# Patient Record
Sex: FEMALE | Race: WHITE | NOT HISPANIC OR LATINO | Employment: FULL TIME | URBAN - METROPOLITAN AREA
[De-identification: names, ages, dates, MRNs, and addresses within clinical notes are randomized per-mention and may not be internally consistent; named-entity substitution may affect disease eponyms.]

---

## 2018-01-22 ENCOUNTER — PATIENT MESSAGE (OUTPATIENT)
Dept: ADMINISTRATIVE | Facility: OTHER | Age: 23
End: 2018-01-22

## 2018-01-22 ENCOUNTER — OFFICE VISIT (OUTPATIENT)
Dept: NEUROLOGY | Facility: CLINIC | Age: 23
End: 2018-01-22
Payer: COMMERCIAL

## 2018-01-22 VITALS
DIASTOLIC BLOOD PRESSURE: 78 MMHG | SYSTOLIC BLOOD PRESSURE: 126 MMHG | WEIGHT: 172.19 LBS | HEART RATE: 78 BPM | BODY MASS INDEX: 29.4 KG/M2 | HEIGHT: 64 IN

## 2018-01-22 DIAGNOSIS — G43.009 MIGRAINE WITHOUT AURA AND WITHOUT STATUS MIGRAINOSUS, NOT INTRACTABLE: ICD-10-CM

## 2018-01-22 DIAGNOSIS — G47.9 TROUBLE IN SLEEPING: ICD-10-CM

## 2018-01-22 DIAGNOSIS — R51.9 CHRONIC DAILY HEADACHE: Primary | ICD-10-CM

## 2018-01-22 DIAGNOSIS — G44.229 CHRONIC TENSION-TYPE HEADACHE, NOT INTRACTABLE: ICD-10-CM

## 2018-01-22 DIAGNOSIS — M79.18 MYOFASCIAL PAIN: ICD-10-CM

## 2018-01-22 PROCEDURE — 99999 PR PBB SHADOW E&M-NEW PATIENT-LVL III: CPT | Mod: PBBFAC,,, | Performed by: NURSE PRACTITIONER

## 2018-01-22 PROCEDURE — 99204 OFFICE O/P NEW MOD 45 MIN: CPT | Mod: S$GLB,,, | Performed by: NURSE PRACTITIONER

## 2018-01-22 RX ORDER — PREDNISONE 20 MG/1
TABLET ORAL
Qty: 30 TABLET | Refills: 0 | Status: SHIPPED | OUTPATIENT
Start: 2018-01-22 | End: 2018-07-10

## 2018-01-22 RX ORDER — TIZANIDINE 2 MG/1
TABLET ORAL
Qty: 60 TABLET | Refills: 5 | Status: SHIPPED | OUTPATIENT
Start: 2018-01-22 | End: 2018-07-10

## 2018-01-22 RX ORDER — DICLOFENAC SODIUM 75 MG/1
TABLET, DELAYED RELEASE ORAL
Qty: 25 TABLET | Refills: 3 | Status: SHIPPED | OUTPATIENT
Start: 2018-01-22

## 2018-01-22 NOTE — PATIENT INSTRUCTIONS
As discussed, here is the telephone number for a PCP here at [facility]. [phone]. If you prefer to go outside of [facility], our clinic will be happy to send copies of our records upon request.     Please download migraine judy deion on phone and begin tracking your headaches

## 2018-01-22 NOTE — PROGRESS NOTES
SUBJECTIVE:  Patient ID: Belinda Paz   MRN: [id number]  Referred By: Aaareferral Self  Chief Complaint: Headache and Consult    History of Present Illness:   22 y.o. female with headaches, presents to clinic alone for evaluation of headaches.     Headaches began May 2017, headaches are located at the nape of her neck, traveling towards the occiput and eventually encompass the entire back of her head from ear to ear, lately she has been waking up with a headache everyday. Headaches are described as a moderate to severe pressure or throbbing type pain. Worsened with movement or activity.  Now headaches are all day, everyday waxing and waning throughout the day.  Pain can be rated anywhere from a 4-8 out of 10, intensifying to peak over 1-2 hours.  Has been taking Excedrin daily.  Headaches have been occurring daily, waxing and waning throughout the day, offers when she wakes up pain is typically rated a 4 out of 10, and intensify to 7-8 out of 10 just before meal times.    Prior to May 2017, she would get headaches but only a couple times per month and headaches were not in the back of her head, they were more temporally located.  Denies changes in her vision, confusion, fever, weakness, bowel or bladder dysfunction.  Since May, she has tried to rule out different causes of her headaches.  She changed her birth control in May to NuvaRing which worsened her headaches and restarted taking OCP shortly after.  She ruled out dehydration, caffeine excess or withdrawal.  Headaches are better after she takes Excedrin and for about 30 minutes after she eats.  Started a [employer] job last January, stress is turbulent, lives in [place], works on the [employer], moved to the [place] area for a few months but moved back [place] .   She has missed 3 days of work in the last month due to her headaches.   Associated Symptoms - nauseated, phonophobia, tail bone pain, between her shoulder blades hurt, neck pain, photophobia,  "worsened with movement/activity   Triggers - going too long between meals   Aggravating Factors - looking at a computer screen, staying at work, loud noise, movement    Alleviating Factors - excedrin, eating a meal   Head Trauma? Infection? Fever? Cancer? Pregnancy? <-- had menstrual cycle 2 weeks ago, on OCP   Recent Changes - new job beginning last January, moved to [place] then back to [place]   Sleep - "okay", wakes up a couple of times throughout the night, she does not feel rested when she wakes up, gets 6 hours of sleep, she feels she wakes up more throughout the night since may than in the past   Eyes - Eyes last checked last March   Family Hx of Migraines (mom)  Positives in bold: Hx of Kidney Stones, asthma, GI bleed, osteoporosis, CAD/MI, CVA/TIA, DM <-- denies, states she does have a family history of thyroid problems     Treatments Tried and Response  Excedrin - helps   Voltaren - given today   Zanaflex - given today  Prednisone - given today     Current Medications:    TRINESSA, 28, 0.18/0.215/0.25 mg-35 mcg (28) tablet, TK 1 T PO QD, Disp: , Rfl: 3    diclofenac (VOLTAREN) 75 MG EC tablet, 1 tab every 8-10 hours as needed for headaches. No more than 2 tabs/day or 3 days/week., Disp: 25 tablet, Rfl: 3    predniSONE (DELTASONE) 20 MG tablet, 3 tabs in AM x 5 days, then 2 tabs in AM x 5 days, then 1 tab in Am x 5 days. Take with food., Disp: 30 tablet, Rfl: 0    tiZANidine (ZANAFLEX) 2 MG tablet, 1-2 tabs around bedtime. No driving with medication, no alcohol., Disp: 60 tablet, Rfl: 5    Review of Systems - as per HPI, otherwise a balanced 10 systems review is negative.    OBJECTIVE:  Vitals:  /78   Pulse 78   Ht 5' 4" (1.626 m)   Wt 78.1 kg (172 lb 2.9 oz)   BMI 29.55 kg/m²     Physical Exam   Constitutional: She appears well-developed and well-nourished. She is well groomed. NAD  HENT:    Head: Normocephalic and atraumatic, oral and nasal mucosa intact. Mild oral crowding present. " Frontalis was TTP bilaterally, temporalis was TTP bilaterally    Eyes: Conjunctivae and EOM are normal. Pupils are equal, round, and reactive to light   Neck: Neck supple. Occiput TTP bilaterally and trapezius TTP bilaterally, traps tight bilaterally R>L.  DROM in neck, pain elicited with movement in all directions, worst with right lateral bend.   Musculoskeletal: Normal range of motion. No joint stiffness. No vertebral point tenderness.  Skin: Skin is warm and dry.  Psychiatric: Normal mood and affect.     Neuro exam:    Mental status:  The patient is awake, alert, and oriented to person, place and time.  Language is intact and fluent  Remote and recent memory are intact  Normal attention and concentration  Mood is stable    Cranial Nerves:  Pupils are equal and reactive to light.    Extraocular movements are intact and without nystagmus.    Visual fields are full to confrontation testing.   Facial movement is symmetric.  Facial sensation is intact.    Hearing is intact to finger rub   Uvula in midline. Tongue in midline without fasciculation.   FROM of neck in all (6) directions.  Shoulder shrug symmetrical.    Coordination:     Finger to nose - normal and symmetric bilaterally   Heel to shin test - normal and symmetric bilaterally     Motor:  Normal muscle bulk and symmetry. No fasciculations were noted.   Tremor not apparent   Pronator drift not apparent.    strength was strong and symmetric   Finger extension strength was strong and symmetric   RUE:appropriate against gravity and medium force as tested 5/5  LUE: appropriate against gravity and medium force as tested 5/5  RLE:appropriate against gravity and medium force as tested 5/5              LLE: appropriate against gravity and medium force as tested 5/5    Reflexes:  Right Brachioradialis 2+  Left Brachioradialis 2+  Right Biceps 2+  Left Biceps 2+  Right Patellar2+  Left Patellar 2+  Right Achilles 2+  Left Achilles 2+                           Plantar flexion bilat   Cunningham was negative bilaterally      Sensory:  RUE  intact light touch  LUE intact light touch    RLE intact light touch  LLE intact light touch    Gait:   Romberg - negative   Normal gait  Tandem, Heel, and Toe Walk - able to perform without difficulty     ASSESSMENT:  1. Chronic daily headache    2. Chronic tension-type headache, not intractable    3. Myofascial pain    4. Trouble in sleeping    5. Migraine without aura and without status migrainosus, not intractable      PLAN:  - To break up headache cycle - Prednisone 60 mg x 5 days, 40 mg x 5 days, 20 mg x 5 days    Take medication in the morning with food   - For headache prevention - start Zanaflex 2 mg nightly, 1-2 tabs around bedtime, no alcohol or driving with medication   - For acute headaches - given Voltaren 75 mg tabs, 1 tab every 8-10 hours as needed for headaches   - Start tracking headaches via Migraine Buddy deion on phone, discussed importance of tracking headaches with regards to symptoms she experiences when she has a headache  - May start triptan therapy at next appointment   - Offered referral to physical therapy - she deferred at this time   - Enrolled patient in virtual migraine questionnaire    - Offered to order lab work, she deferred this  - Encouraged her to establish care with PCP, given number to [facility]   - RTC in 6 weeks or sooner if needed      Orders Placed This Encounter    tiZANidine (ZANAFLEX) 2 MG tablet    predniSONE (DELTASONE) 20 MG tablet    diclofenac (VOLTAREN) 75 MG EC tablet    Virtual Migraine Questionnaire Series     I have discussed realistic goals of care with patient at length as well as medication options, and need for lifestyle adjustment. I have explained that treatment will take time. We have agreed that the goal will be to reduce frequency/intensity/quantity of HA, not to be completely HA free. I have explained my non narcotic policy regarding headache  treatment.    Patient to track frequency of headaches.  Patient agreeable to work on lifestyle adjustments.    I spent 50 minutes face-to-face with the patient with >50% of the time spent with counseling and education regarding:  - results of data, diagnosis, and recommendations stated above  - risks, benefits, and potential side effects of prednisone, zanaflex, and diclofenac discussed   - alternative treatment options including amitriptyline, vistaril offered   - importance of healthy diet, regular exercise and sleep hygiene in the treatment of headaches      Questions and concerns were sought and answered to the patient's stated verbal satisfaction.  The patient verbalizes understanding and agreement with the above stated treatment plan.       Zully Patterson, TRIP-C  [facility]  [phone]    Dr. Woodson was available during today's encounter.

## 2018-01-24 ENCOUNTER — PATIENT MESSAGE (OUTPATIENT)
Dept: NEUROLOGY | Facility: CLINIC | Age: 23
End: 2018-01-24

## 2018-02-01 ENCOUNTER — PATIENT MESSAGE (OUTPATIENT)
Dept: NEUROLOGY | Facility: CLINIC | Age: 23
End: 2018-02-01

## 2018-07-10 ENCOUNTER — TELEPHONE (OUTPATIENT)
Dept: NEUROLOGY | Facility: CLINIC | Age: 23
End: 2018-07-10

## 2018-07-10 ENCOUNTER — TELEPHONE (OUTPATIENT)
Dept: OBSTETRICS AND GYNECOLOGY | Facility: CLINIC | Age: 23
End: 2018-07-10

## 2018-07-10 ENCOUNTER — OFFICE VISIT (OUTPATIENT)
Dept: OBSTETRICS AND GYNECOLOGY | Facility: CLINIC | Age: 23
End: 2018-07-10
Payer: COMMERCIAL

## 2018-07-10 VITALS
DIASTOLIC BLOOD PRESSURE: 78 MMHG | HEIGHT: 64 IN | BODY MASS INDEX: 28.91 KG/M2 | WEIGHT: 169.31 LBS | SYSTOLIC BLOOD PRESSURE: 128 MMHG

## 2018-07-10 DIAGNOSIS — N92.1 METRORRHAGIA: ICD-10-CM

## 2018-07-10 DIAGNOSIS — N94.6 DYSMENORRHEA: ICD-10-CM

## 2018-07-10 DIAGNOSIS — Z01.419 WELL WOMAN EXAM WITH ROUTINE GYNECOLOGICAL EXAM: Primary | ICD-10-CM

## 2018-07-10 DIAGNOSIS — Z30.41 ENCOUNTER FOR SURVEILLANCE OF CONTRACEPTIVE PILLS: ICD-10-CM

## 2018-07-10 PROCEDURE — 99385 PREV VISIT NEW AGE 18-39: CPT | Mod: S$GLB,,, | Performed by: NURSE PRACTITIONER

## 2018-07-10 PROCEDURE — 88175 CYTOPATH C/V AUTO FLUID REDO: CPT

## 2018-07-10 PROCEDURE — 99999 PR PBB SHADOW E&M-EST. PATIENT-LVL III: CPT | Mod: PBBFAC,,, | Performed by: NURSE PRACTITIONER

## 2018-07-10 RX ORDER — LEVONORGESTREL AND ETHINYL ESTRADIOL 0.15-0.03
1 KIT ORAL DAILY
Qty: 84 TABLET | Refills: 4 | Status: SHIPPED | OUTPATIENT
Start: 2018-07-10 | End: 2018-07-10

## 2018-07-10 NOTE — PROGRESS NOTES
"HISTORY OF PRESENT ILLNESS:    Belinda Paz is a 22 y.o. female, ., Patient's last menstrual period was 2018 (exact date).,  presents for a routine exam and OCP refills.  -Here to establish care.  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   -Was on Nuvaring for 6 months and had weight gain, despite diet and exercise, and tearful mood swings so started OCPs.  -Has been on Tri-yesica for a year and initially had no problems.  -Lately has menstrual migraines, weight gain despite diet, exercise, mood swings and heavy periods for full 7 days with cramping.   -Denies clots, gushing, missing work and denies life stressors.  -Ran out of pills for a month and mood swings - anxiety is less - hasn't had a period yet.     Past Medical History:   Diagnosis Date    Headache        PAST SURGICAL HISTORY:  History reviewed. No pertinent surgical history.    MEDICATIONS AND ALLERGIES:  Voltaren  Review of patient's allergies indicates:  No Known Allergies    Family History   Problem Relation Age of Onset    Migraines Mother     Cancer Father     Diabetes Maternal Grandmother     Colon cancer Maternal Aunt     Breast cancer Neg Hx     Ovarian cancer Neg Hx        Social History     Social History    Marital status: Single     Spouse name: N/A    Number of children: N/A    Years of education: N/A     Occupational History    Not on file.     Social History Main Topics    Smoking status: Never Smoker    Smokeless tobacco: Never Used    Alcohol use 3.0 oz/week     5 Glasses of wine per week    Drug use: No    Sexual activity: Yes     Partners: Male     Birth control/ protection: OCP     Other Topics Concern    Not on file     Social History Narrative    No narrative on file       OB HISTORY: None.     COMPREHENSIVE GYN HISTORY:  PAP History: Denies abnormal Paps. UNKNOWN DATE OF LAST PAP "NEG".  Infection History: Denies STDs. Denies PID.  Benign History: Denies uterine fibroids. " "Denies ovarian cysts. Denies endometriosis. Denies other conditions.  Cancer History: Denies cervical cancer. Denies uterine cancer or hyperplasia. Denies ovarian cancer. Denies vulvar cancer or pre-cancer. Denies vaginal cancer or pre-cancer. Denies breast cancer. Denies colon cancer.  Sexual Activity History: Reports currently being sexually active  Menstrual History: Monthly. Heavy flow. 7 Days.  Dysmenorrhea History: Reports mod. ysmenorrhea.   Contraception: Condoms.    ROS:  GENERAL: + WT GAIN. No swelling. No fatigue. No fever.  CARDIOVASCULAR: No chest pain. No shortness of breath. No leg cramps.   NEUROLOGICAL: + MENSTRUAL MIGRAINES.  BREASTS: No pain. No lumps. No discharge.  ABDOMEN: + CRAMPING.  No nausea. No vomiting. No diarrhea. No constipation.  REPRODUCTIVE: + HEAVY PERIODS.   VULVA: No pain. No lesions. No itching.  VAGINA: No relaxation. No itching. No odor. No discharge. No lesions.  URINARY: No incontinence. No nocturia. No frequency. No dysuria.    /78   Ht 5' 4" (1.626 m)   Wt 76.8 kg (169 lb 5 oz)   LMP 06/25/2018 (Exact Date)   BMI 29.06 kg/m²     PE:  APPEARANCE: Well nourished, well developed, in no acute distress.  AFFECT: WNL, alert and oriented x 3.  SKIN: No acne or hirsutism.  NECK: Neck symmetric, without masses or thyromegaly.  NODES: No inguinal, cervical, axillary or femoral lymph node enlargement.  CHEST: Good respiratory effort.   ABDOMEN: Soft. No tenderness or masses.   BREASTS: Symmetrical, no skin changes, visible lesions, palpable masses or nipple discharge bilaterally.  PELVIC: External female genitalia without lesions.  Female hair distribution. Adequate perineal body, Normal urethral meatus. Vagina moist and well rugated without lesions or discharge.  No significant cystocele or rectocele present. Cervix pink without lesions, discharge or tenderness. Uterus is 4-6 week size, regular, mobile and nontender. Adnexa without masses or tenderness.  EXTREMITIES: No " edema    DIAGNOSIS:  1. Well woman exam with routine gynecological exam    2. Encounter for surveillance of contraceptive pills    3. Dysmenorrhea    4. Metrorrhagia        PLAN:    Orders Placed This Encounter    Liquid-based pap smear, screening   Declined STD testing    COUNSELING:  The patient was counseled today on:  -all contraceptive options;  -to stop all hormonal Rx for 6 months to see if her anxiety - mood swings - wt gain - heavy bleeding / cramping resolves;  -if it does then to consider a Copper IUD;  -if not, will order a pelvic ultrasound and go from there;  -STDs and prevention including the Gardasil vaccine (has completed 3/3);  -A.C.S. Pap and pelvic exam guidelines (pap every 3 years);  -to follow up with her PCP for other health maintenance.    FOLLOW-UP with me annually.

## 2018-07-10 NOTE — TELEPHONE ENCOUNTER
----- Message from TERA Pearson sent at 7/10/2018  3:29 PM CDT -----  Contact: pt @ [phone]  I did not order any labs on this patient.  Could someone call her and find out which labs she is referring to, she may want OB to order the labs if they are hormonally related.     [place]     ----- Message -----  From: Jerica Holbrook RN  Sent: 7/10/2018  11:56 AM  To: TERA Pearson        ----- Message -----  From: Olena Ochoa  Sent: 7/10/2018   9:36 AM  To: Yesenia Andrea Staff    Calling to speak with someone in Dr. Patterson's office regarding her getting labs done that the doctor gave her orders in March. Asking if she can still have the labs done before she see the OBGyn. Please call.

## 2018-10-03 ENCOUNTER — PATIENT MESSAGE (OUTPATIENT)
Dept: OBSTETRICS AND GYNECOLOGY | Facility: CLINIC | Age: 23
End: 2018-10-03

## 2018-10-04 RX ORDER — ETONOGESTREL AND ETHINYL ESTRADIOL VAGINAL RING .015; .12 MG/D; MG/D
1 RING VAGINAL
Qty: 3 EACH | Refills: 4 | Status: SHIPPED | OUTPATIENT
Start: 2018-10-04 | End: 2019-02-05 | Stop reason: SDUPTHER

## 2019-02-06 RX ORDER — ETONOGESTREL AND ETHINYL ESTRADIOL VAGINAL RING .015; .12 MG/D; MG/D
1 RING VAGINAL
Qty: 3 EACH | Refills: 4 | Status: SHIPPED | OUTPATIENT
Start: 2019-02-06 | End: 2020-02-06